# Patient Record
Sex: FEMALE | Race: WHITE | NOT HISPANIC OR LATINO
[De-identification: names, ages, dates, MRNs, and addresses within clinical notes are randomized per-mention and may not be internally consistent; named-entity substitution may affect disease eponyms.]

---

## 2017-04-13 PROBLEM — L71.9 ROSACEA CONJUNCTIVITIS(372.31): Status: ACTIVE | Noted: 2017-04-13

## 2017-06-02 ENCOUNTER — PRIOR ORIGINAL RECORDS (OUTPATIENT)
Dept: OTHER | Age: 77
End: 2017-06-02

## 2017-10-20 ENCOUNTER — CHARTING TRANS (OUTPATIENT)
Dept: OTHER | Age: 77
End: 2017-10-20

## 2017-11-03 ENCOUNTER — PRIOR ORIGINAL RECORDS (OUTPATIENT)
Dept: OTHER | Age: 77
End: 2017-11-03

## 2017-11-27 PROBLEM — E55.9 VITAMIN D DEFICIENCY: Status: ACTIVE | Noted: 2017-11-27

## 2017-11-27 PROBLEM — H10.829 ROSACEA CONJUNCTIVITIS: Status: ACTIVE | Noted: 2017-04-13

## 2017-11-27 PROBLEM — L71.8 ROSACEA CONJUNCTIVITIS: Status: ACTIVE | Noted: 2017-04-13

## 2017-11-27 PROBLEM — J44.9 CHRONIC OBSTRUCTIVE PULMONARY DISEASE, UNSPECIFIED COPD TYPE (HCC): Status: ACTIVE | Noted: 2017-11-27

## 2017-12-04 LAB
BUN: 16 MG/DL
CHOLESTEROL, TOTAL: 192 MG/DL
HDL CHOLESTEROL: 111 MG/DL
LDL CHOLESTEROL: 70 MG/DL
TRIGLYCERIDES: 55 MG/DL
VITAMIN D 25-OH: 97.27 NG/ML

## 2017-12-05 ENCOUNTER — PRIOR ORIGINAL RECORDS (OUTPATIENT)
Dept: OTHER | Age: 77
End: 2017-12-05

## 2018-03-17 ENCOUNTER — PRIOR ORIGINAL RECORDS (OUTPATIENT)
Dept: OTHER | Age: 78
End: 2018-03-17

## 2018-03-17 ENCOUNTER — LAB ENCOUNTER (OUTPATIENT)
Dept: LAB | Age: 78
End: 2018-03-17
Payer: MEDICARE

## 2018-03-17 DIAGNOSIS — E78.00 PURE HYPERCHOLESTEROLEMIA: Primary | ICD-10-CM

## 2018-03-17 LAB
ALT SERPL-CCNC: 25 U/L (ref 14–54)
AST SERPL-CCNC: 22 U/L (ref 15–41)

## 2018-03-17 PROCEDURE — 84450 TRANSFERASE (AST) (SGOT): CPT

## 2018-03-17 PROCEDURE — 84460 ALANINE AMINO (ALT) (SGPT): CPT

## 2018-03-19 LAB
ALBUMIN: 3.5 G/DL
ALKALINE PHOSPHATATE(ALK PHOS): 70 IU/L
ALT (SGPT): 31 U/L
AST (SGOT): 22 U/L
BILIRUBIN TOTAL: 0.57 MG/DL
BUN: 16 MG/DL
CALCIUM: 9.6 MG/DL
CHLORIDE: 108 MEQ/L
CHOLESTEROL, TOTAL: 173 MG/DL
CREATININE, SERUM: 0.87 MG/DL
GLUCOSE: 99 MG/DL
GLUCOSE: 99 MG/DL
LDL CHOLESTEROL: 68 MG/DL
POTASSIUM, SERUM: 4.1 MEQ/L
PROTEIN, TOTAL: 6.7 G/DL
SGOT (AST): 22 IU/L
SGPT (ALT): 31 IU/L
SODIUM: 144 MEQ/L
TRIGLYCERIDES: 53 MG/DL

## 2018-03-20 PROBLEM — F32.1 MODERATE SINGLE CURRENT EPISODE OF MAJOR DEPRESSIVE DISORDER (HCC): Status: ACTIVE | Noted: 2018-03-20

## 2018-03-20 PROBLEM — L71.8 ROSACEA CONJUNCTIVITIS: Status: RESOLVED | Noted: 2017-04-13 | Resolved: 2018-03-20

## 2018-03-20 PROBLEM — H10.829 ROSACEA CONJUNCTIVITIS: Status: RESOLVED | Noted: 2017-04-13 | Resolved: 2018-03-20

## 2018-12-04 ENCOUNTER — MYAURORA ACCOUNT LINK (OUTPATIENT)
Dept: OTHER | Age: 78
End: 2018-12-04

## 2018-12-06 ENCOUNTER — PRIOR ORIGINAL RECORDS (OUTPATIENT)
Dept: OTHER | Age: 78
End: 2018-12-06

## 2018-12-17 ENCOUNTER — PRIOR ORIGINAL RECORDS (OUTPATIENT)
Dept: OTHER | Age: 78
End: 2018-12-17

## 2018-12-17 ENCOUNTER — MYAURORA ACCOUNT LINK (OUTPATIENT)
Dept: OTHER | Age: 78
End: 2018-12-17

## 2019-02-28 VITALS
WEIGHT: 141 LBS | HEIGHT: 59 IN | BODY MASS INDEX: 28.43 KG/M2 | SYSTOLIC BLOOD PRESSURE: 100 MMHG | HEART RATE: 90 BPM | DIASTOLIC BLOOD PRESSURE: 60 MMHG

## 2019-02-28 VITALS
DIASTOLIC BLOOD PRESSURE: 60 MMHG | OXYGEN SATURATION: 98 % | BODY MASS INDEX: 29.64 KG/M2 | SYSTOLIC BLOOD PRESSURE: 110 MMHG | HEIGHT: 59 IN | HEART RATE: 67 BPM | WEIGHT: 147 LBS

## 2019-03-12 ENCOUNTER — LAB ENCOUNTER (OUTPATIENT)
Dept: LAB | Age: 79
End: 2019-03-12
Attending: INTERNAL MEDICINE
Payer: MEDICARE

## 2019-03-12 DIAGNOSIS — E78.00 PURE HYPERCHOLESTEROLEMIA: Primary | ICD-10-CM

## 2019-03-12 LAB
ALBUMIN SERPL-MCNC: 3.8 G/DL
ALBUMIN SERPL-MCNC: 3.8 G/DL (ref 3.4–5)
ALBUMIN/GLOB SERPL: 1.2 {RATIO} (ref 1–2)
ALBUMIN/GLOB SERPL: NORMAL {RATIO}
ALP LIVER SERPL-CCNC: 71 U/L (ref 55–142)
ALP SERPL-CCNC: 71 U/L
ALT SERPL-CCNC: 29 U/L
ALT SERPL-CCNC: 29 U/L (ref 13–56)
ANION GAP SERPL CALC-SCNC: 7 MMOL/L (ref 0–18)
ANION GAP SERPL CALC-SCNC: NORMAL MMOL/L
AST SERPL-CCNC: 26 U/L
AST SERPL-CCNC: 26 U/L (ref 15–37)
BILIRUB SERPL-MCNC: 0.6 MG/DL
BILIRUB SERPL-MCNC: 0.6 MG/DL (ref 0.1–2)
BUN BLD-MCNC: 14 MG/DL (ref 7–18)
BUN SERPL-MCNC: 14 MG/DL
BUN/CREAT SERPL: 14.7 (ref 10–20)
BUN/CREAT SERPL: NORMAL
CALCIUM BLD-MCNC: 9.1 MG/DL (ref 8.5–10.1)
CALCIUM SERPL-MCNC: 9.1 MG/DL
CHLORIDE SERPL-SCNC: 111 MMOL/L
CHLORIDE SERPL-SCNC: 111 MMOL/L (ref 98–107)
CHOLEST SERPL-MCNC: 191 MG/DL
CHOLEST/HDLC SERPL: NORMAL {RATIO}
CK SERPL-CCNC: 107 U/L
CO2 SERPL-SCNC: 27 MMOL/L (ref 21–32)
CO2 SERPL-SCNC: NORMAL MMOL/L
CREAT BLD-MCNC: 0.95 MG/DL (ref 0.55–1.02)
CREAT SERPL-MCNC: 0.95 MG/DL
GLOBULIN PLAS-MCNC: 3.1 G/DL (ref 2.8–4.4)
GLOBULIN SER-MCNC: 3.1 G/DL
GLUCOSE BLD-MCNC: 94 MG/DL (ref 70–99)
GLUCOSE SERPL-MCNC: 94 MG/DL
HDLC SERPL-MCNC: 105 MG/DL
LDLC SERPL CALC-MCNC: 74 MG/DL
LENGTH OF FAST TIME PATIENT: NORMAL H
LENGTH OF FAST TIME PATIENT: NORMAL H
M PROTEIN MFR SERPL ELPH: 6.9 G/DL (ref 6.4–8.2)
NONHDLC SERPL-MCNC: NORMAL MG/DL
OSMOLALITY SERPL CALC.SUM OF ELEC: 300 MOSM/KG (ref 275–295)
POTASSIUM SERPL-SCNC: 4.1 MMOL/L
POTASSIUM SERPL-SCNC: 4.1 MMOL/L (ref 3.5–5.1)
PROT SERPL-MCNC: 6.9 G/DL
SODIUM SERPL-SCNC: 145 MMOL/L
SODIUM SERPL-SCNC: 145 MMOL/L (ref 136–145)
TRIGL SERPL-MCNC: 59 MG/DL
VLDLC SERPL CALC-MCNC: NORMAL MG/DL

## 2019-03-12 PROCEDURE — 80053 COMPREHEN METABOLIC PANEL: CPT

## 2019-03-13 ENCOUNTER — CLINICAL ABSTRACT (OUTPATIENT)
Dept: CARDIOLOGY | Age: 79
End: 2019-03-13

## 2019-03-14 ENCOUNTER — CLINICAL ABSTRACT (OUTPATIENT)
Dept: CARDIOLOGY | Age: 79
End: 2019-03-14

## 2019-03-22 ENCOUNTER — TELEPHONE (OUTPATIENT)
Dept: CARDIOLOGY | Age: 79
End: 2019-03-22

## 2019-04-17 ENCOUNTER — TELEPHONE (OUTPATIENT)
Dept: CARDIOLOGY | Age: 79
End: 2019-04-17

## 2019-04-18 PROBLEM — E78.00 HYPERCHOLESTEROLEMIA: Status: ACTIVE | Noted: 2019-04-18

## 2019-04-18 PROBLEM — I25.10 CAD (CORONARY ARTERY DISEASE): Status: ACTIVE | Noted: 2019-04-18

## 2019-04-18 PROBLEM — Z98.61 HISTORY OF PTCA: Status: ACTIVE | Noted: 2019-04-18

## 2019-04-18 RX ORDER — ALPRAZOLAM 0.25 MG/1
0.25 TABLET ORAL NIGHTLY PRN
COMMUNITY
Start: 2016-12-23

## 2019-04-18 RX ORDER — PRAVASTATIN SODIUM 20 MG
20 TABLET ORAL
COMMUNITY
Start: 2017-12-05 | End: 2019-05-21 | Stop reason: SDUPTHER

## 2019-04-18 RX ORDER — FLUTICASONE PROPIONATE 50 MCG
SPRAY, SUSPENSION (ML) NASAL
COMMUNITY
Start: 2017-12-05

## 2019-04-18 RX ORDER — TRAZODONE HYDROCHLORIDE 50 MG/1
50 TABLET ORAL
COMMUNITY
Start: 2016-12-23

## 2019-04-19 ENCOUNTER — APPOINTMENT (OUTPATIENT)
Dept: CARDIOLOGY | Age: 79
End: 2019-04-19

## 2019-04-19 ENCOUNTER — OFFICE VISIT (OUTPATIENT)
Dept: CARDIOLOGY | Age: 79
End: 2019-04-19

## 2019-04-19 DIAGNOSIS — Z98.61 HISTORY OF PTCA: Primary | ICD-10-CM

## 2019-04-19 DIAGNOSIS — Z01.810 PREOP CARDIOVASCULAR EXAM: ICD-10-CM

## 2019-04-19 DIAGNOSIS — I25.10 CORONARY ARTERY DISEASE INVOLVING NATIVE CORONARY ARTERY OF NATIVE HEART WITHOUT ANGINA PECTORIS: ICD-10-CM

## 2019-04-19 PROCEDURE — 99214 OFFICE O/P EST MOD 30 MIN: CPT | Performed by: INTERNAL MEDICINE

## 2019-04-19 PROCEDURE — 93000 ELECTROCARDIOGRAM COMPLETE: CPT | Performed by: INTERNAL MEDICINE

## 2019-04-19 ASSESSMENT — PAIN SCALES - GENERAL: PAINLEVEL: 0

## 2019-04-20 RX ORDER — CHOLECALCIFEROL (VITAMIN D3) 125 MCG
5 CAPSULE ORAL DAILY
COMMUNITY

## 2019-04-22 ENCOUNTER — DOCUMENTATION (OUTPATIENT)
Dept: CARDIOLOGY | Age: 79
End: 2019-04-22

## 2019-04-23 NOTE — H&P
[facility]    PATIENT'S NAME: Rafaela Rodrigues   ATTENDING PHYSICIAN: Delmar Mandel MD   PATIENT ACCOUNT#:   [id number]    LOCATION:  [facility]  MEDICAL RECORD #:   [id number]       YOB: 1940  ADMISSION DATE:       04/25/201 lower extremity shows slight fullness over the prepatellar bursa right knee without erythema. A small sinus is noted with a small amount of bursal fluid that can be expressed with palpation of the prepatellar bursa.   She comes out to full extension, flexe

## 2019-04-25 ENCOUNTER — ANESTHESIA EVENT (OUTPATIENT)
Dept: SURGERY | Facility: HOSPITAL | Age: 79
End: 2019-04-25

## 2019-04-25 ENCOUNTER — HOSPITAL ENCOUNTER (OUTPATIENT)
Facility: HOSPITAL | Age: 79
Setting detail: HOSPITAL OUTPATIENT SURGERY
Discharge: HOME | End: 2019-04-25
Attending: ORTHOPAEDIC SURGERY | Admitting: ORTHOPAEDIC SURGERY
Payer: MEDICARE

## 2019-04-25 ENCOUNTER — ANESTHESIA (OUTPATIENT)
Dept: SURGERY | Facility: HOSPITAL | Age: 79
End: 2019-04-25

## 2019-04-25 VITALS
WEIGHT: 146.13 LBS | HEIGHT: 58 IN | TEMPERATURE: 98 F | BODY MASS INDEX: 30.67 KG/M2 | DIASTOLIC BLOOD PRESSURE: 68 MMHG | HEART RATE: 51 BPM | SYSTOLIC BLOOD PRESSURE: 142 MMHG | OXYGEN SATURATION: 99 % | RESPIRATION RATE: 14 BRPM

## 2019-04-25 DIAGNOSIS — M70.51 PATELLAR BURSITIS OF RIGHT KNEE: ICD-10-CM

## 2019-04-25 PROCEDURE — 0JBN0ZZ EXCISION OF RIGHT LOWER LEG SUBCUTANEOUS TISSUE AND FASCIA, OPEN APPROACH: ICD-10-PCS | Performed by: ORTHOPAEDIC SURGERY

## 2019-04-25 PROCEDURE — 0MTN0ZZ RESECTION OF RIGHT KNEE BURSA AND LIGAMENT, OPEN APPROACH: ICD-10-PCS | Performed by: ORTHOPAEDIC SURGERY

## 2019-04-25 PROCEDURE — 88304 TISSUE EXAM BY PATHOLOGIST: CPT | Performed by: ORTHOPAEDIC SURGERY

## 2019-04-25 PROCEDURE — 88305 TISSUE EXAM BY PATHOLOGIST: CPT | Performed by: ORTHOPAEDIC SURGERY

## 2019-04-25 RX ORDER — ONDANSETRON 2 MG/ML
INJECTION INTRAMUSCULAR; INTRAVENOUS AS NEEDED
Status: DISCONTINUED | OUTPATIENT
Start: 2019-04-25 | End: 2019-04-25 | Stop reason: SURG

## 2019-04-25 RX ORDER — SODIUM CHLORIDE, SODIUM LACTATE, POTASSIUM CHLORIDE, CALCIUM CHLORIDE 600; 310; 30; 20 MG/100ML; MG/100ML; MG/100ML; MG/100ML
INJECTION, SOLUTION INTRAVENOUS CONTINUOUS
Status: DISCONTINUED | OUTPATIENT
Start: 2019-04-25 | End: 2019-04-25

## 2019-04-25 RX ORDER — HYDROCODONE BITARTRATE AND ACETAMINOPHEN 5; 325 MG/1; MG/1
1 TABLET ORAL AS NEEDED
Status: DISCONTINUED | OUTPATIENT
Start: 2019-04-25 | End: 2019-04-25

## 2019-04-25 RX ORDER — METOCLOPRAMIDE 10 MG/1
10 TABLET ORAL ONCE
Status: DISCONTINUED | OUTPATIENT
Start: 2019-04-25 | End: 2019-04-25 | Stop reason: HOSPADM

## 2019-04-25 RX ORDER — DEXAMETHASONE SODIUM PHOSPHATE 4 MG/ML
VIAL (ML) INJECTION AS NEEDED
Status: DISCONTINUED | OUTPATIENT
Start: 2019-04-25 | End: 2019-04-25 | Stop reason: SURG

## 2019-04-25 RX ORDER — HYDROCODONE BITARTRATE AND ACETAMINOPHEN 5; 325 MG/1; MG/1
2 TABLET ORAL AS NEEDED
Status: DISCONTINUED | OUTPATIENT
Start: 2019-04-25 | End: 2019-04-25

## 2019-04-25 RX ORDER — ACETAMINOPHEN 500 MG
1000 TABLET ORAL ONCE
Status: COMPLETED | OUTPATIENT
Start: 2019-04-25 | End: 2019-04-25

## 2019-04-25 RX ORDER — LIDOCAINE HYDROCHLORIDE 10 MG/ML
INJECTION, SOLUTION EPIDURAL; INFILTRATION; INTRACAUDAL; PERINEURAL AS NEEDED
Status: DISCONTINUED | OUTPATIENT
Start: 2019-04-25 | End: 2019-04-25 | Stop reason: SURG

## 2019-04-25 RX ORDER — ONDANSETRON 2 MG/ML
4 INJECTION INTRAMUSCULAR; INTRAVENOUS ONCE AS NEEDED
Status: DISCONTINUED | OUTPATIENT
Start: 2019-04-25 | End: 2019-04-25

## 2019-04-25 RX ORDER — MORPHINE SULFATE 10 MG/ML
6 INJECTION, SOLUTION INTRAMUSCULAR; INTRAVENOUS EVERY 10 MIN PRN
Status: DISCONTINUED | OUTPATIENT
Start: 2019-04-25 | End: 2019-04-25

## 2019-04-25 RX ORDER — HALOPERIDOL 5 MG/ML
0.25 INJECTION INTRAMUSCULAR ONCE AS NEEDED
Status: DISCONTINUED | OUTPATIENT
Start: 2019-04-25 | End: 2019-04-25

## 2019-04-25 RX ORDER — CEFAZOLIN SODIUM/WATER 2 G/20 ML
2 SYRINGE (ML) INTRAVENOUS ONCE
Status: COMPLETED | OUTPATIENT
Start: 2019-04-25 | End: 2019-04-25

## 2019-04-25 RX ORDER — TRAMADOL HYDROCHLORIDE 50 MG/1
TABLET ORAL
Qty: 20 TABLET | Refills: 0 | Status: SHIPPED | OUTPATIENT
Start: 2019-04-25 | End: 2019-11-08

## 2019-04-25 RX ORDER — FAMOTIDINE 20 MG/1
20 TABLET ORAL ONCE
Status: DISCONTINUED | OUTPATIENT
Start: 2019-04-25 | End: 2019-04-25 | Stop reason: HOSPADM

## 2019-04-25 RX ORDER — BUPIVACAINE HYDROCHLORIDE AND EPINEPHRINE 5; 5 MG/ML; UG/ML
INJECTION, SOLUTION PERINEURAL AS NEEDED
Status: DISCONTINUED | OUTPATIENT
Start: 2019-04-25 | End: 2019-04-25 | Stop reason: HOSPADM

## 2019-04-25 RX ORDER — MORPHINE SULFATE 4 MG/ML
4 INJECTION, SOLUTION INTRAMUSCULAR; INTRAVENOUS EVERY 10 MIN PRN
Status: DISCONTINUED | OUTPATIENT
Start: 2019-04-25 | End: 2019-04-25

## 2019-04-25 RX ORDER — MORPHINE SULFATE 4 MG/ML
2 INJECTION, SOLUTION INTRAMUSCULAR; INTRAVENOUS EVERY 10 MIN PRN
Status: DISCONTINUED | OUTPATIENT
Start: 2019-04-25 | End: 2019-04-25

## 2019-04-25 RX ORDER — NALOXONE HYDROCHLORIDE 0.4 MG/ML
80 INJECTION, SOLUTION INTRAMUSCULAR; INTRAVENOUS; SUBCUTANEOUS AS NEEDED
Status: DISCONTINUED | OUTPATIENT
Start: 2019-04-25 | End: 2019-04-25

## 2019-04-25 RX ADMIN — CEFAZOLIN SODIUM/WATER 2 G: 2 G/20 ML SYRINGE (ML) INTRAVENOUS at 14:42:00

## 2019-04-25 RX ADMIN — DEXAMETHASONE SODIUM PHOSPHATE 4 MG: 4 MG/ML VIAL (ML) INJECTION at 14:57:00

## 2019-04-25 RX ADMIN — LIDOCAINE HYDROCHLORIDE 50 MG: 10 INJECTION, SOLUTION EPIDURAL; INFILTRATION; INTRACAUDAL; PERINEURAL at 14:37:00

## 2019-04-25 RX ADMIN — ONDANSETRON 4 MG: 2 INJECTION INTRAMUSCULAR; INTRAVENOUS at 14:57:00

## 2019-04-25 RX ADMIN — SODIUM CHLORIDE, SODIUM LACTATE, POTASSIUM CHLORIDE, CALCIUM CHLORIDE: 600; 310; 30; 20 INJECTION, SOLUTION INTRAVENOUS at 14:34:00

## 2019-04-25 NOTE — ANESTHESIA PROCEDURE NOTES
Airway  Urgency: elective      General Information and Staff    Patient location during procedure: OR  Anesthesiologist: Dasha Pineda MD  Resident/CRNA: Gabo Loving CRNA  Performed: CRNA     Indications and Patient Condition  Indications for airway

## 2019-04-25 NOTE — ANESTHESIA PREPROCEDURE EVALUATION
Anesthesia PreOp Note    HPI:     Ron Garner is a 67year old female who presents for preoperative consultation requested by: Ricco Herman MD    Date of Surgery: 4/25/2019    Procedure(s):  EXCISION LESION LOWER EXTREMITY  Indication: Patellar • Hepatitis     possible history now resolved   • Obstructive apnea     AHI 25 on CPAP 6 [employer]   • OTHER DISEASES     bilateral CTS   • OTHER DISEASES     venous varicosities   • Sleep apnea        Past Surgical History:   Procedure Laterality Date   • History   Problem Relation Age of Onset   • Cancer Sister 67        breast cancer   • Breast Cancer Sister         age 70   • Breast Cancer Maternal Grandmother         age 80   • Heart Disorder Father    • Cancer Mother         colon CA   • Breast Cancer Component Value Date     03/12/2019     03/12/2019    K 4.1 03/12/2019    K 4.09 03/12/2019     (H) 03/12/2019     (H) 03/12/2019    CO2 27.0 03/12/2019    CO2 24.1 03/12/2019    BUN 14 03/12/2019    BUN 14.0 03/12/2019    VICTOR M

## 2019-04-25 NOTE — INTERVAL H&P NOTE
Pre-op Diagnosis: Patellar bursitis of right knee [M70.51]    The above referenced H&P was reviewed by Yee Ma MD on 4/25/2019, the patient was examined and no significant changes have occurred in the patient's condition since the H&P was perfor

## 2019-04-25 NOTE — ANESTHESIA POSTPROCEDURE EVALUATION
Patient: Stalin Serrato    Procedure Summary     Date:  04/25/19 Room / Location:  [facility] OR [address] [place] [address] OR    Anesthesia Start:  1665 Anesthesia Stop:  2582    Procedure:  EXCISION LESION LOWER EXTREMITY (Right ) Diagnosis:       Patellar bursitis of

## 2019-04-25 NOTE — BRIEF OP NOTE
Pre-Operative Diagnosis: Patellar bursitis of right knee [M70.51]     Post-Operative Diagnosis: Patellar bursitis of right knee [M70.51]      Procedure Performed:   Procedure(s):  RIGHT KNEE EXCISION OF PREPATELLAR BURSA    Surgeon(s) and Role:     * Leandra

## 2019-04-26 NOTE — OPERATIVE REPORT
[facility]    PATIENT'S NAME: Juan Ramon Trevino   ATTENDING PHYSICIAN: Delmar Zimmer MD   OPERATING PHYSICIAN: Delmar Zimmer MD   PATIENT ACCOUNT#:   [id number]    LOCATION:  [facility] 4 [facility] 10  MEDICAL RECORD #:   [id number]       D followed by a pneumatic pressure tourniquet. The right lower extremity was prepped and draped in the usual sterile fashion. Time-out was performed. The limb was then prepped and draped in usual sterile fashion.   The limb was then exsanguinated using Esm

## 2019-05-21 RX ORDER — PRAVASTATIN SODIUM 20 MG
TABLET ORAL
Qty: 90 TABLET | Refills: 2 | Status: SHIPPED | OUTPATIENT
Start: 2019-05-21 | End: 2020-01-29 | Stop reason: SDUPTHER

## 2019-10-30 PROCEDURE — 88304 TISSUE EXAM BY PATHOLOGIST: CPT | Performed by: SURGERY

## 2019-12-17 ENCOUNTER — OFFICE VISIT (OUTPATIENT)
Dept: CARDIOLOGY | Age: 79
End: 2019-12-17

## 2019-12-17 VITALS
WEIGHT: 147 LBS | OXYGEN SATURATION: 95 % | HEIGHT: 59 IN | SYSTOLIC BLOOD PRESSURE: 136 MMHG | HEART RATE: 71 BPM | BODY MASS INDEX: 29.64 KG/M2 | DIASTOLIC BLOOD PRESSURE: 80 MMHG

## 2019-12-17 DIAGNOSIS — I25.10 CORONARY ARTERY DISEASE INVOLVING NATIVE CORONARY ARTERY OF NATIVE HEART WITHOUT ANGINA PECTORIS: Primary | ICD-10-CM

## 2019-12-17 DIAGNOSIS — E78.00 HYPERCHOLESTEROLEMIA: ICD-10-CM

## 2019-12-17 PROCEDURE — 99214 OFFICE O/P EST MOD 30 MIN: CPT | Performed by: INTERNAL MEDICINE

## 2019-12-17 ASSESSMENT — PATIENT HEALTH QUESTIONNAIRE - PHQ9
1. LITTLE INTEREST OR PLEASURE IN DOING THINGS: NOT AT ALL
2. FEELING DOWN, DEPRESSED OR HOPELESS: NOT AT ALL
SUM OF ALL RESPONSES TO PHQ9 QUESTIONS 1 AND 2: 0
SUM OF ALL RESPONSES TO PHQ9 QUESTIONS 1 AND 2: 0

## 2020-01-29 RX ORDER — PRAVASTATIN SODIUM 20 MG
TABLET ORAL
Qty: 90 TABLET | Refills: 1 | Status: SHIPPED | OUTPATIENT
Start: 2020-01-29 | End: 2020-07-23

## 2020-03-09 LAB
ALT SERPL-CCNC: 13 UNITS/L
AST SERPL-CCNC: 22 UNITS/L
CHOLEST SERPL-MCNC: 170 MG/DL
HDLC SERPL-MCNC: 97 MG/DL
LDLC SERPL CALC-MCNC: 65 MG/DL
LENGTH OF FAST TIME PATIENT: YES H
TRIGL SERPL-MCNC: 39 MG/DL
VLDLC SERPL CALC-MCNC: 8 MG/DL

## 2020-07-23 RX ORDER — PRAVASTATIN SODIUM 20 MG
TABLET ORAL
Qty: 90 TABLET | Refills: 1 | Status: SHIPPED | OUTPATIENT
Start: 2020-07-23 | End: 2021-01-15

## 2020-12-01 PROBLEM — Z80.0 FAMILY HISTORY OF COLON CANCER: Status: ACTIVE | Noted: 2020-12-01

## 2020-12-22 ENCOUNTER — TELEPHONE (OUTPATIENT)
Dept: CARDIOLOGY | Age: 80
End: 2020-12-22

## 2020-12-27 ENCOUNTER — E-ADVICE (OUTPATIENT)
Dept: CARDIOLOGY | Age: 80
End: 2020-12-27

## 2021-01-15 RX ORDER — PRAVASTATIN SODIUM 20 MG
TABLET ORAL
Qty: 90 TABLET | Refills: 0 | Status: SHIPPED | OUTPATIENT
Start: 2021-01-15 | End: 2021-04-12

## 2021-01-29 ENCOUNTER — OFFICE VISIT (OUTPATIENT)
Dept: CARDIOLOGY | Age: 81
End: 2021-01-29

## 2021-01-29 VITALS
BODY MASS INDEX: 29.81 KG/M2 | SYSTOLIC BLOOD PRESSURE: 124 MMHG | HEIGHT: 58 IN | DIASTOLIC BLOOD PRESSURE: 68 MMHG | HEART RATE: 75 BPM | WEIGHT: 142 LBS

## 2021-01-29 DIAGNOSIS — E78.00 HYPERCHOLESTEROLEMIA: ICD-10-CM

## 2021-01-29 DIAGNOSIS — I25.10 CORONARY ARTERY DISEASE INVOLVING NATIVE CORONARY ARTERY OF NATIVE HEART WITHOUT ANGINA PECTORIS: Primary | ICD-10-CM

## 2021-01-29 PROCEDURE — 99214 OFFICE O/P EST MOD 30 MIN: CPT | Performed by: INTERNAL MEDICINE

## 2021-01-29 ASSESSMENT — PATIENT HEALTH QUESTIONNAIRE - PHQ9
CLINICAL INTERPRETATION OF PHQ2 SCORE: NO FURTHER SCREENING NEEDED
CLINICAL INTERPRETATION OF PHQ9 SCORE: NO FURTHER SCREENING NEEDED
SUM OF ALL RESPONSES TO PHQ9 QUESTIONS 1 AND 2: 0
1. LITTLE INTEREST OR PLEASURE IN DOING THINGS: NOT AT ALL
SUM OF ALL RESPONSES TO PHQ9 QUESTIONS 1 AND 2: 0
2. FEELING DOWN, DEPRESSED OR HOPELESS: NOT AT ALL

## 2021-03-05 ENCOUNTER — E-ADVICE (OUTPATIENT)
Dept: CARDIOLOGY | Age: 81
End: 2021-03-05

## 2021-03-05 DIAGNOSIS — Z01.812 PRE-OPERATIVE LABORATORY EXAMINATION: Primary | ICD-10-CM

## 2021-03-08 LAB
CHOLEST SERPL-MCNC: 181 MG/DL
HDLC SERPL-MCNC: 102 MG/DL
LDLC SERPL CALC-MCNC: 67 MG/DL
LENGTH OF FAST TIME PATIENT: YES H
TRIGL SERPL-MCNC: 62 MG/DL
VLDLC SERPL CALC-MCNC: 12 MG/DL

## 2021-03-11 ENCOUNTER — TELEPHONE (OUTPATIENT)
Dept: CARDIOLOGY | Age: 81
End: 2021-03-11

## 2021-04-12 RX ORDER — PRAVASTATIN SODIUM 20 MG
TABLET ORAL
Qty: 90 TABLET | Refills: 2 | Status: SHIPPED | OUTPATIENT
Start: 2021-04-12

## 2021-05-21 ENCOUNTER — APPOINTMENT (OUTPATIENT)
Dept: CARDIOLOGY | Age: 81
End: 2021-05-21

## 2021-05-25 VITALS
SYSTOLIC BLOOD PRESSURE: 118 MMHG | HEIGHT: 58 IN | DIASTOLIC BLOOD PRESSURE: 70 MMHG | HEART RATE: 81 BPM | OXYGEN SATURATION: 98 % | WEIGHT: 144 LBS | BODY MASS INDEX: 30.23 KG/M2

## 2022-01-17 ENCOUNTER — APPOINTMENT (OUTPATIENT)
Dept: CARDIOLOGY | Age: 82
End: 2022-01-17
Attending: INTERNAL MEDICINE

## 2022-01-28 ENCOUNTER — APPOINTMENT (OUTPATIENT)
Dept: CARDIOLOGY | Age: 82
End: 2022-01-28

## 2024-11-11 ENCOUNTER — OFFICE VISIT (OUTPATIENT)
Dept: SURGERY | Facility: CLINIC | Age: 84
End: 2024-11-11
Payer: COMMERCIAL

## 2024-11-11 DIAGNOSIS — M65.341 TRIGGER RING FINGER OF RIGHT HAND: Primary | ICD-10-CM

## 2024-11-11 PROCEDURE — 99204 OFFICE O/P NEW MOD 45 MIN: CPT | Performed by: PLASTIC SURGERY

## 2024-11-11 RX ORDER — HYDROCODONE BITARTRATE AND ACETAMINOPHEN 7.5; 325 MG/1; MG/1
1 TABLET ORAL
Qty: 10 TABLET | Refills: 0 | Status: SHIPPED | OUTPATIENT
Start: 2024-11-11

## 2024-11-11 RX ORDER — OMEGA-3/DHA/EPA/FISH OIL 60 MG-90MG
1500 CAPSULE ORAL DAILY
COMMUNITY

## 2024-11-11 RX ORDER — CETIRIZINE HYDROCHLORIDE 10 MG/1
10 TABLET ORAL DAILY
COMMUNITY
Start: 2023-04-03

## 2024-11-11 RX ORDER — IPRATROPIUM BROMIDE 21 UG/1
2 SPRAY, METERED NASAL 2 TIMES DAILY
COMMUNITY
Start: 2024-09-20

## 2024-11-11 NOTE — PROGRESS NOTES
Patient request for surgery signed by patient and witnessed and signed by RN.  Prescription for Norco 7.5 mg electronically sent to pharmacy per Dr. Fields's order and patient instructed to  prescription before surgery.   Pre-Surgical Instruction Handout, Hand Elevation Handout, and Post-Operative Instruction Handout given to and reviewed w/patient.  All questions and concerns answered; pt verbalized an understanding of all pre-operative teaching.  Patient instructed to call the office with any further questions and/or concerns.  Patient escorted to surgery scheduling to schedule surgery and post-operative appointments.

## 2024-11-11 NOTE — H&P (VIEW-ONLY)
Lissette Mack is a 84 year old female that presents with   Chief Complaint   Patient presents with    Pain     Trigger RRF   .    REFERRED BY:  No ref. provider found    Pacemaker: No  Latex Allergy: no  Coumadin: No  Plavix: No  Other anticoagulants: No  Diet medication: No  Cardiac stents: YES     HAND DOMINANCE:  Right    Profession: Retired    RECONSTRUCTIVE HISTORY    SUN EXPOSURE   Current no   Past no   Sunburns no   Tanning salons current no   Tanning salons past no     SKIN CANCER    Personal history of skin cancer: none      HPI:       84-year-old female right-hand-dominant with RRF trigger    4 years duration    Pain with triggering and locking    She has had an injection    It helped for a short period of time    She had bilateral endoscopic carpal tunnel 20 years ago with complete relief of symptoms          Review of Systems:   Constitutional: No change in appetite, chill/rigors, or fatigue  GI: No jaundice  Endocrine: No generalized weakness  Neurological: No aphasia, loss of consciousness, or seizures    Musculoskeletal:    TRIGGER    Right    ring finger    Duration:   3-4 years    PAIN  Yes  TRIGGER Yes  LOCKING Yes    PREVIOUS TREATMENT Injection    Treatment helped?  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    DEPRESSION    Heart attack (HCC)    Hepatitis    possible history now resolved    Obstructive apnea    AHI 25 on CPAP 6 [employer]    OTHER DISEASES    bilateral CTS    OTHER DISEASES    venous varicosities    Sleep apnea        SURGICAL  Past Surgical History:   Procedure Laterality Date    Angioplasty (coronary)      stent    Cataract Bilateral April and May 2018    Colonoscopy  11/2015    Colonoscopy  12/2020    Colonoscopy N/A 12/11/2020    Procedure: COLONOSCOPY, POSSIBLE BIOPSY, POSSIBLE POLYPECTOMY 67740;  Surgeon: Carlos Bennett MD;  Location: [facility] SURGICAL [place], [facility]    Glaucoma surgery  Bilateral April and May 2018    non DMG ophtho    Other accessory Bilateral 2005    RECTR/LECTR    Other surgical history Right 10/31/2019    Exc seb cyst rt lower back/[facility]-Dr Ayesha WOODWARD  Allergies[1]     MEDICATIONS  Current Outpatient Medications   Medication Sig Dispense Refill    Omega-3 Fatty Acids (FISH OIL) 500 MG Oral Cap Take 3 capsules (1,500 mg total) by mouth daily.      ALPRAZolam 0.25 MG Oral Tab Take 1 tablet (0.25 mg total) by mouth nightly as needed for Sleep. 30 tablet 0    traZODone 50 MG Oral Tab Take 1 tablet (50 mg total) by mouth nightly. 90 tablet 3    Fluticasone Propionate 50 MCG/ACT Nasal Suspension INSTILL 2 SPRAY IN EACH NOSTRIL DAILY 48 g 0    Calcium-Magnesium 500-250 MG Oral Tab 1500mg daily      aspirin 81 MG Oral Tab Take 2 tablets (162 mg total) by mouth daily.      Vitamin D3 1000 units Oral Tab Take 1 tablet (1,000 Units total) by mouth daily.      Pravastatin Sodium (PRAVACHOL) 20 MG Oral Tab Take 1 tablet (20 mg total) by mouth nightly.      cetirizine 10 MG Oral Tab Take 1 tablet (10 mg total) by mouth daily. (Patient not taking: Reported on 2024)      ipratropium 0.03 % Nasal Solution 2 sprays by Nasal route 2 (two) times daily. (Patient not taking: Reported on 2024)          SOCIAL HISTORY  Social History     Socioeconomic History    Marital status: Single   Tobacco Use    Smoking status: Former     Current packs/day: 0.00     Average packs/day: 1 pack/day for 25.0 years (25.0 ttl pk-yrs)     Types: Cigarettes     Start date: 1976     Quit date: 2001     Years since quittin.8    Smokeless tobacco: Never    Tobacco comments:     quit    Substance and Sexual Activity    Alcohol use: Yes     Comment: 1 per day    Drug use: No   Other Topics Concern    Right Handed Yes        FAMILY HISTORY  Family History   Problem Relation Age of Onset    Cancer Sister 72        breast cancer    Breast Cancer Sister 71        age 71    Breast Cancer  Maternal Grandmother 82        age 82    Heart Disorder Father     Cancer Mother         colon CA    Breast Cancer Maternal Aunt 65        age 65          PHYSICAL EXAM:     CONSTITUTIONAL: Overall appearance - Normal  HEENT: Normocephalic  EYES: Conjunctiva - Right: Normal, Left: Normal; EOMI  EARS: Inspection - Right: Normal, Left: Normal  NECK/THYROID: Inspection - Normal, Palpation - Normal, Thyroid gland - Normal, No adenopathy  RESPIRATORY: Inspection - Normal, Effort - Normal  CARDIOVASCULAR: Regular rhythm, No murmurs  ABDOMEN: Inspection - Normal, No abdominal tenderness  NEURO: Memory intact  PSYCH: Oriented to person, place, time, and situation, Appropriate mood and affect      Hand Physical Exam:       RRF MP tenderness with triggering and locking      ASSESSMENT/PLAN:       TRIGGER DIGIT RRF          We discussed what a TRIGGER DIGIT is, including treatment options.  Questions were answered and the patient wishes to proceed with treatment.     This needs surgery.   I explained the procedure at length, including  post-operative course and risks as indicated on the Surgical Request Form.  Therapy may be necessary post-operatively.    RISKS:     Bleeding  Infection  Scar  Pain  Stiffness  Weakness  Loss of function  Anesthesia risks               11/11/2024  Martin Fields MD    [facility] Data Reviewed.               +++++++++++++++++++++++++++++++++++++++++++++++++    MEDICAL DECISION MAKING    PROBLEMS      MODERATE    (number / complexity)          Undiagnosed new problem with uncertain prognosis    DATA         STRAIGHTFORWARD    (amount / complexity)           MANAGEMENT RISK  HIGH    (complications/ morbidity)       Major surgery with risk factors                  MDM LEVEL    MODERATE            [1] No Known Allergies

## 2024-11-11 NOTE — H&P
Lissette Mack is a 84 year old female that presents with   Chief Complaint   Patient presents with    Pain     Trigger RRF   .    REFERRED BY:  No ref. provider found    Pacemaker: No  Latex Allergy: no  Coumadin: No  Plavix: No  Other anticoagulants: No  Diet medication: No  Cardiac stents: YES     HAND DOMINANCE:  Right    Profession: Retired    RECONSTRUCTIVE HISTORY    SUN EXPOSURE   Current no   Past no   Sunburns no   Tanning salons current no   Tanning salons past no     SKIN CANCER    Personal history of skin cancer: none      HPI:       84-year-old female right-hand-dominant with RRF trigger    4 years duration    Pain with triggering and locking    She has had an injection    It helped for a short period of time    She had bilateral endoscopic carpal tunnel 20 years ago with complete relief of symptoms          Review of Systems:   Constitutional: No change in appetite, chill/rigors, or fatigue  GI: No jaundice  Endocrine: No generalized weakness  Neurological: No aphasia, loss of consciousness, or seizures    Musculoskeletal:    TRIGGER    Right    ring finger    Duration:   3-4 years    PAIN  Yes  TRIGGER Yes  LOCKING Yes    PREVIOUS TREATMENT Injection    Treatment helped?  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    DEPRESSION    Heart attack (HCC)    Hepatitis    possible history now resolved    Obstructive apnea    AHI 25 on CPAP 6 [employer]    OTHER DISEASES    bilateral CTS    OTHER DISEASES    venous varicosities    Sleep apnea        SURGICAL  Past Surgical History:   Procedure Laterality Date    Angioplasty (coronary)      stent    Cataract Bilateral April and May 2018    Colonoscopy  11/2015    Colonoscopy  12/2020    Colonoscopy N/A 12/11/2020    Procedure: COLONOSCOPY, POSSIBLE BIOPSY, POSSIBLE POLYPECTOMY 77994;  Surgeon: Carlos Bennett MD;  Location: [facility] SURGICAL [place], [facility]    Glaucoma surgery  Bilateral April and May 2018    non DMG ophtho    Other accessory Bilateral 2005    RECTR/LECTR    Other surgical history Right 10/31/2019    Exc seb cyst rt lower back/[facility]-Dr Ayesha WOODWARD  Allergies[1]     MEDICATIONS  Current Outpatient Medications   Medication Sig Dispense Refill    Omega-3 Fatty Acids (FISH OIL) 500 MG Oral Cap Take 3 capsules (1,500 mg total) by mouth daily.      ALPRAZolam 0.25 MG Oral Tab Take 1 tablet (0.25 mg total) by mouth nightly as needed for Sleep. 30 tablet 0    traZODone 50 MG Oral Tab Take 1 tablet (50 mg total) by mouth nightly. 90 tablet 3    Fluticasone Propionate 50 MCG/ACT Nasal Suspension INSTILL 2 SPRAY IN EACH NOSTRIL DAILY 48 g 0    Calcium-Magnesium 500-250 MG Oral Tab 1500mg daily      aspirin 81 MG Oral Tab Take 2 tablets (162 mg total) by mouth daily.      Vitamin D3 1000 units Oral Tab Take 1 tablet (1,000 Units total) by mouth daily.      Pravastatin Sodium (PRAVACHOL) 20 MG Oral Tab Take 1 tablet (20 mg total) by mouth nightly.      cetirizine 10 MG Oral Tab Take 1 tablet (10 mg total) by mouth daily. (Patient not taking: Reported on 2024)      ipratropium 0.03 % Nasal Solution 2 sprays by Nasal route 2 (two) times daily. (Patient not taking: Reported on 2024)          SOCIAL HISTORY  Social History     Socioeconomic History    Marital status: Single   Tobacco Use    Smoking status: Former     Current packs/day: 0.00     Average packs/day: 1 pack/day for 25.0 years (25.0 ttl pk-yrs)     Types: Cigarettes     Start date: 1976     Quit date: 2001     Years since quittin.8    Smokeless tobacco: Never    Tobacco comments:     quit    Substance and Sexual Activity    Alcohol use: Yes     Comment: 1 per day    Drug use: No   Other Topics Concern    Right Handed Yes        FAMILY HISTORY  Family History   Problem Relation Age of Onset    Cancer Sister 72        breast cancer    Breast Cancer Sister 71        age 71    Breast Cancer  Maternal Grandmother 82        age 82    Heart Disorder Father     Cancer Mother         colon CA    Breast Cancer Maternal Aunt 65        age 65          PHYSICAL EXAM:     CONSTITUTIONAL: Overall appearance - Normal  HEENT: Normocephalic  EYES: Conjunctiva - Right: Normal, Left: Normal; EOMI  EARS: Inspection - Right: Normal, Left: Normal  NECK/THYROID: Inspection - Normal, Palpation - Normal, Thyroid gland - Normal, No adenopathy  RESPIRATORY: Inspection - Normal, Effort - Normal  CARDIOVASCULAR: Regular rhythm, No murmurs  ABDOMEN: Inspection - Normal, No abdominal tenderness  NEURO: Memory intact  PSYCH: Oriented to person, place, time, and situation, Appropriate mood and affect      Hand Physical Exam:       RRF MP tenderness with triggering and locking      ASSESSMENT/PLAN:       TRIGGER DIGIT RRF          We discussed what a TRIGGER DIGIT is, including treatment options.  Questions were answered and the patient wishes to proceed with treatment.     This needs surgery.   I explained the procedure at length, including  post-operative course and risks as indicated on the Surgical Request Form.  Therapy may be necessary post-operatively.    RISKS:     Bleeding  Infection  Scar  Pain  Stiffness  Weakness  Loss of function  Anesthesia risks               11/11/2024  Martin Fields MD    [facility] Data Reviewed.               +++++++++++++++++++++++++++++++++++++++++++++++++    MEDICAL DECISION MAKING    PROBLEMS      MODERATE    (number / complexity)          Undiagnosed new problem with uncertain prognosis    DATA         STRAIGHTFORWARD    (amount / complexity)           MANAGEMENT RISK  HIGH    (complications/ morbidity)       Major surgery with risk factors                  MDM LEVEL    MODERATE            [1] No Known Allergies

## 2024-11-27 NOTE — DISCHARGE INSTRUCTIONS
Dr Fields Discharge Instructions      Martin Fields M.D.   [phone]  Plastic and Reconstructive Surgery, Hand Surgery  [address], [address]  [place] [address]     GENERAL INSTRUCTIONS:  Do not remove dressing for any reason.  Keep dressing clean and dry.  Some drainage (blood and fluid) through the dressing is expected.  Some swelling is normal.  Take medications as directed.      HANDS:  Keep elevated (above heart-level) at all times.  Check fingertips for circulation.  Keep in a sling at all times.         YOU HAVE AN APPOINTMENT AT THE OFFICE ON  12-4-24.                  HOME INSTRUCTIONS  [facility] HOME CARE INSTRUCTIONS: POST-OP ANESTHESIA  The medication that you received for sedation or general anesthesia can last up to 24 hours. Your judgment and reflexes may be altered, even if you feel like your normal self.      We Recommend:   Do not drive any motor vehicle or bicycle   Avoid mowing the lawn, playing sports, or working with power tools/applicances (power saws, electric knives or mixers)   That you have someone stay with you on your first night home   Do not drink alcohol or take sleeping pills or tranquilizers   Do not sign legal documents within 24 hours of your procedure   If you had a nerve block for your surgery, take extra care not to put any pressure on your arm or hand for 24 hours    It is normal:  For you to have a sore throat if you had a breathing tube during surgery (while you were asleep!). The sore throat should get better within 48 hours. You can gargle with warm salt water (1/2 tsp in 4 oz warm water) or use a throat lozenge for comfort  To feel muscle aches or soreness especially in the abdomen, chest or neck. The achy feeling should go away in the next 24 hours  To feel weak, sleepy or \"wiped out\". Your should start feeling better in the next 24 hours.   To experience mild discomforts such as sore lip or tongue, headache, cramps, gas pains or a bloated  feeling in your abdomen.   To experience mild back pain or soreness for a day or two if you had spinal or epidural anesthesia.   If you had laparoscopic surgery, to feel shoulder pain or discomfort on the day of surgery.   For some patients to have nausea after surgery/anesthesia    If you feel nausea or experience vomiting:   Try to move around less.   Eat less than usual or drink only liquids until the next morning   Nausea should resolve in about 24 hours    If you have a problem when you are at home:    Call your surgeons office   Discharge Instructions: After Your Surgery  You’ve just had surgery. During surgery, you were given medicine called anesthesia to keep you relaxed and free of pain. After surgery, you may have some pain or nausea. This is common. Here are some tips for feeling better and getting well after surgery.   Going home  Your healthcare provider will show you how to take care of yourself when you go home. They'll also answer your questions. Have an adult family member or friend drive you home. For the first 24 hours after your surgery:   Don't drive or use heavy equipment.  Don't make important decisions or sign legal papers.  Take medicines as directed.  Don't drink alcohol.  Have someone stay with you, if needed. They can watch for problems and help keep you safe.  Be sure to go to all follow-up visits with your healthcare provider. And rest after your surgery for as long as your provider tells you to.   Coping with pain  If you have pain after surgery, pain medicine will help you feel better. Take it as directed, before pain becomes severe. Also, ask your healthcare provider or pharmacist about other ways to control pain. This might be with heat, ice, or relaxation. And follow any other instructions your surgeon or nurse gives you.      Stay on schedule with your medicine.     Tips for taking pain medicine  To get the best relief possible, remember these points:   Pain medicines can upset  your stomach. Taking them with a little food may help.  Most pain relievers taken by mouth need at least 20 to 30 minutes to start to work.  Don't wait till your pain becomes severe before you take your medicine. Try to time your medicine so that you can take it before starting an activity. This might be before you get dressed, go for a walk, or sit down for dinner.  Constipation is a common side effect of some pain medicines. Call your healthcare provider before taking any medicines such as laxatives or stool softeners to help ease constipation. Also ask if you should skip any foods. Drinking lots of fluids and eating foods such as fruits and vegetables that are high in fiber can also help. Remember, don't take laxatives unless your surgeon has prescribed them.  Drinking alcohol and taking pain medicine can cause dizziness and slow your breathing. It can even be deadly. Don't drink alcohol while taking pain medicine.  Pain medicine can make you react more slowly to things. Don't drive or run machinery while taking pain medicine.  Your healthcare provider may tell you to take acetaminophen to help ease your pain. Ask them how much you're supposed to take each day. Acetaminophen or other pain relievers may interact with your prescription medicines or other over-the-counter (OTC) medicines. Some prescription medicines have acetaminophen and other ingredients in them. Using both prescription and OTC acetaminophen for pain can cause you to accidentally overdose. Read the labels on your OTC medicines with care. This will help you to clearly know the list of ingredients, how much to take, and any warnings. It may also help you not take too much acetaminophen. If you have questions or don't understand the information, ask your pharmacist or healthcare provider to explain it to you before you take the OTC medicine.   Managing nausea  Some people have an upset stomach (nausea) after surgery. This is often because of  anesthesia, pain, or pain medicine, less movement of food in the stomach, or the stress of surgery. These tips will help you handle nausea and eat healthy foods as you get better. If you were on a special food plan before surgery, ask your healthcare provider if you should follow it while you get better. Check with your provider on how your eating should progress. It may depend on the surgery you had. These general tips may help:   Don't push yourself to eat. Your body will tell you when to eat and how much.  Start off with clear liquids and soup. They're easier to digest.  Next try semi-solid foods as you feel ready. These include mashed potatoes, applesauce, and gelatin.  Slowly move to solid foods. Don’t eat fatty, rich, or spicy foods at first.  Don't force yourself to have 3 large meals a day. Instead eat smaller amounts more often.  Take pain medicines with a small amount of solid food, such as crackers or toast. This helps prevent nausea.  When to call your healthcare provider  Call your healthcare provider right away if you have any of these:   You still have too much pain, or the pain gets worse, after taking the medicine. The medicine may not be strong enough. Or there may be a complication from the surgery.  You feel too sleepy, dizzy, or groggy. The medicine may be too strong.  Side effects such as nausea or vomiting. Your healthcare provider may advise taking other medicines to .  Skin changes such as rash, itching, or hives. This may mean you have an allergic reaction. Your provider may advise taking other medicines.  The incision looks different (for instance, part of it opens up).  Bleeding or fluid leaking from the incision site, and weren't told to expect that.  Fever of 100.4°F (38°C) or higher, or as directed by your provider.  Call 911  Call 911 right away if you have:   Trouble breathing  Facial swelling    If you have obstructive sleep apnea   You were given anesthesia medicine during surgery  to keep you comfortable and free of pain. After surgery, you may have more apnea spells because of this medicine and other medicines you were given. The spells may last longer than normal.    At home:  Keep using the continuous positive airway pressure (CPAP) device when you sleep. Unless your healthcare provider tells you not to, use it when you sleep, day or night. CPAP is a common device used to treat obstructive sleep apnea.  Talk with your provider before taking any pain medicine, muscle relaxants, or sedatives. Your provider will tell you about the possible dangers of taking these medicines.  Contact your provider if your sleeping changes a lot even when taking medicines as directed.  [employer] last reviewed this educational content on 10/1/2021  © 7941-1109 [employer]. All rights reserved. This information is not intended as a substitute for professional medical care. Always follow your healthcare professional's instructions.        Discharge Instructions: After Your Surgery  You’ve just had surgery. During surgery, you were given medicine called anesthesia to keep you relaxed and free of pain. After surgery, you may have some pain or nausea. This is common. Here are some tips for feeling better and getting well after surgery.   Going home  Your healthcare provider will show you how to take care of yourself when you go home. They'll also answer your questions. Have an adult family member or friend drive you home. For the first 24 hours after your surgery:   Don't drive or use heavy equipment.  Don't make important decisions or sign legal papers.  Take medicines as directed.  Don't drink alcohol.  Have someone stay with you, if needed. They can watch for problems and help keep you safe.  Be sure to go to all follow-up visits with your healthcare provider. And rest after your surgery for as long as your provider tells you to.   Coping with pain  If you have pain after surgery, pain medicine will  help you feel better. Take it as directed, before pain becomes severe. Also, ask your healthcare provider or pharmacist about other ways to control pain. This might be with heat, ice, or relaxation. And follow any other instructions your surgeon or nurse gives you.      Stay on schedule with your medicine.     Tips for taking pain medicine  To get the best relief possible, remember these points:   Pain medicines can upset your stomach. Taking them with a little food may help.  Most pain relievers taken by mouth need at least 20 to 30 minutes to start to work.  Don't wait till your pain becomes severe before you take your medicine. Try to time your medicine so that you can take it before starting an activity. This might be before you get dressed, go for a walk, or sit down for dinner.  Constipation is a common side effect of some pain medicines. Call your healthcare provider before taking any medicines such as laxatives or stool softeners to help ease constipation. Also ask if you should skip any foods. Drinking lots of fluids and eating foods such as fruits and vegetables that are high in fiber can also help. Remember, don't take laxatives unless your surgeon has prescribed them.  Drinking alcohol and taking pain medicine can cause dizziness and slow your breathing. It can even be deadly. Don't drink alcohol while taking pain medicine.  Pain medicine can make you react more slowly to things. Don't drive or run machinery while taking pain medicine.  Your healthcare provider may tell you to take acetaminophen to help ease your pain. Ask them how much you're supposed to take each day. Acetaminophen or other pain relievers may interact with your prescription medicines or other over-the-counter (OTC) medicines. Some prescription medicines have acetaminophen and other ingredients in them. Using both prescription and OTC acetaminophen for pain can cause you to accidentally overdose. Read the labels on your OTC medicines  with care. This will help you to clearly know the list of ingredients, how much to take, and any warnings. It may also help you not take too much acetaminophen. If you have questions or don't understand the information, ask your pharmacist or healthcare provider to explain it to you before you take the OTC medicine.   Managing nausea  Some people have an upset stomach (nausea) after surgery. This is often because of anesthesia, pain, or pain medicine, less movement of food in the stomach, or the stress of surgery. These tips will help you handle nausea and eat healthy foods as you get better. If you were on a special food plan before surgery, ask your healthcare provider if you should follow it while you get better. Check with your provider on how your eating should progress. It may depend on the surgery you had. These general tips may help:   Don't push yourself to eat. Your body will tell you when to eat and how much.  Start off with clear liquids and soup. They're easier to digest.  Next try semi-solid foods as you feel ready. These include mashed potatoes, applesauce, and gelatin.  Slowly move to solid foods. Don’t eat fatty, rich, or spicy foods at first.  Don't force yourself to have 3 large meals a day. Instead eat smaller amounts more often.  Take pain medicines with a small amount of solid food, such as crackers or toast. This helps prevent nausea.  When to call your healthcare provider  Call your healthcare provider right away if you have any of these:   You still have too much pain, or the pain gets worse, after taking the medicine. The medicine may not be strong enough. Or there may be a complication from the surgery.  You feel too sleepy, dizzy, or groggy. The medicine may be too strong.  Side effects such as nausea or vomiting. Your healthcare provider may advise taking other medicines to .  Skin changes such as rash, itching, or hives. This may mean you have an allergic reaction. Your provider may  advise taking other medicines.  The incision looks different (for instance, part of it opens up).  Bleeding or fluid leaking from the incision site, and weren't told to expect that.  Fever of 100.4°F (38°C) or higher, or as directed by your provider.  Call 911  Call 911 right away if you have:   Trouble breathing  Facial swelling    If you have obstructive sleep apnea   You were given anesthesia medicine during surgery to keep you comfortable and free of pain. After surgery, you may have more apnea spells because of this medicine and other medicines you were given. The spells may last longer than normal.    At home:  Keep using the continuous positive airway pressure (CPAP) device when you sleep. Unless your healthcare provider tells you not to, use it when you sleep, day or night. CPAP is a common device used to treat obstructive sleep apnea.  Talk with your provider before taking any pain medicine, muscle relaxants, or sedatives. Your provider will tell you about the possible dangers of taking these medicines.  Contact your provider if your sleeping changes a lot even when taking medicines as directed.  [employer] last reviewed this educational content on 10/1/2021  © 6126-1107 [employer]. All rights reserved. This information is not intended as a substitute for professional medical care. Always follow your healthcare professional's instructions.

## 2024-12-03 ENCOUNTER — HOSPITAL ENCOUNTER (OUTPATIENT)
Facility: HOSPITAL | Age: 84
Setting detail: HOSPITAL OUTPATIENT SURGERY
Discharge: HOME | End: 2024-12-03
Attending: PLASTIC SURGERY | Admitting: PLASTIC SURGERY
Payer: MEDICARE

## 2024-12-03 ENCOUNTER — HOSPITAL DOCUMENTATION (OUTPATIENT)
Dept: SURGERY | Facility: CLINIC | Age: 84
End: 2024-12-03

## 2024-12-03 ENCOUNTER — ANESTHESIA EVENT (OUTPATIENT)
Dept: SURGERY | Facility: HOSPITAL | Age: 84
End: 2024-12-03
Payer: MEDICARE

## 2024-12-03 ENCOUNTER — ANESTHESIA (OUTPATIENT)
Dept: SURGERY | Facility: HOSPITAL | Age: 84
End: 2024-12-03
Payer: MEDICARE

## 2024-12-03 VITALS
WEIGHT: 135 LBS | TEMPERATURE: 98 F | DIASTOLIC BLOOD PRESSURE: 64 MMHG | HEIGHT: 58.5 IN | RESPIRATION RATE: 14 BRPM | SYSTOLIC BLOOD PRESSURE: 139 MMHG | BODY MASS INDEX: 27.58 KG/M2 | OXYGEN SATURATION: 95 % | HEART RATE: 58 BPM

## 2024-12-03 DIAGNOSIS — M65.341 TRIGGER RING FINGER OF RIGHT HAND: Primary | ICD-10-CM

## 2024-12-03 DIAGNOSIS — M65.841 OTHER SYNOVITIS AND TENOSYNOVITIS, RIGHT HAND: ICD-10-CM

## 2024-12-03 DIAGNOSIS — M65.341 TRIGGER RING FINGER OF RIGHT HAND: ICD-10-CM

## 2024-12-03 PROBLEM — Z04.9 OBSERVATION FOR SUSPECTED CONDITION: Status: ACTIVE | Noted: 2024-12-03

## 2024-12-03 PROCEDURE — 0LN70ZZ RELEASE RIGHT HAND TENDON, OPEN APPROACH: ICD-10-PCS | Performed by: PLASTIC SURGERY

## 2024-12-03 PROCEDURE — 26145 TENDON EXCISION PALM/FINGER: CPT | Performed by: PLASTIC SURGERY

## 2024-12-03 RX ORDER — KETOROLAC TROMETHAMINE 30 MG/ML
INJECTION, SOLUTION INTRAMUSCULAR; INTRAVENOUS AS NEEDED
Status: DISCONTINUED | OUTPATIENT
Start: 2024-12-03 | End: 2024-12-03 | Stop reason: SURG

## 2024-12-03 RX ORDER — DEXAMETHASONE SODIUM PHOSPHATE 4 MG/ML
VIAL (ML) INJECTION AS NEEDED
Status: DISCONTINUED | OUTPATIENT
Start: 2024-12-03 | End: 2024-12-03 | Stop reason: SURG

## 2024-12-03 RX ORDER — LIDOCAINE HYDROCHLORIDE 10 MG/ML
INJECTION, SOLUTION EPIDURAL; INFILTRATION; INTRACAUDAL; PERINEURAL AS NEEDED
Status: DISCONTINUED | OUTPATIENT
Start: 2024-12-03 | End: 2024-12-03 | Stop reason: SURG

## 2024-12-03 RX ORDER — ACETAMINOPHEN 500 MG
1000 TABLET ORAL ONCE
Status: COMPLETED | OUTPATIENT
Start: 2024-12-03 | End: 2024-12-03

## 2024-12-03 RX ORDER — SODIUM CHLORIDE, SODIUM LACTATE, POTASSIUM CHLORIDE, CALCIUM CHLORIDE 600; 310; 30; 20 MG/100ML; MG/100ML; MG/100ML; MG/100ML
INJECTION, SOLUTION INTRAVENOUS CONTINUOUS
Status: DISCONTINUED | OUTPATIENT
Start: 2024-12-03 | End: 2024-12-03

## 2024-12-03 RX ORDER — NALOXONE HYDROCHLORIDE 0.4 MG/ML
0.08 INJECTION, SOLUTION INTRAMUSCULAR; INTRAVENOUS; SUBCUTANEOUS AS NEEDED
Status: DISCONTINUED | OUTPATIENT
Start: 2024-12-03 | End: 2024-12-03

## 2024-12-03 RX ORDER — HYDROCODONE BITARTRATE AND ACETAMINOPHEN 7.5; 325 MG/1; MG/1
1 TABLET ORAL EVERY 4 HOURS PRN
Status: DISCONTINUED | OUTPATIENT
Start: 2024-12-03 | End: 2024-12-03

## 2024-12-03 RX ORDER — LIDOCAINE HYDROCHLORIDE 10 MG/ML
INJECTION, SOLUTION EPIDURAL; INFILTRATION; INTRACAUDAL; PERINEURAL AS NEEDED
Status: DISCONTINUED | OUTPATIENT
Start: 2024-12-03 | End: 2024-12-03 | Stop reason: HOSPADM

## 2024-12-03 RX ORDER — ONDANSETRON 2 MG/ML
INJECTION INTRAMUSCULAR; INTRAVENOUS AS NEEDED
Status: DISCONTINUED | OUTPATIENT
Start: 2024-12-03 | End: 2024-12-03 | Stop reason: SURG

## 2024-12-03 RX ADMIN — KETOROLAC TROMETHAMINE 15 MG: 30 INJECTION, SOLUTION INTRAMUSCULAR; INTRAVENOUS at 10:48:00

## 2024-12-03 RX ADMIN — LIDOCAINE HYDROCHLORIDE 50 MG: 10 INJECTION, SOLUTION EPIDURAL; INFILTRATION; INTRACAUDAL; PERINEURAL at 10:00:00

## 2024-12-03 RX ADMIN — ONDANSETRON 4 MG: 2 INJECTION INTRAMUSCULAR; INTRAVENOUS at 10:13:00

## 2024-12-03 RX ADMIN — DEXAMETHASONE SODIUM PHOSPHATE 4 MG: 4 MG/ML VIAL (ML) INJECTION at 10:13:00

## 2024-12-03 NOTE — ANESTHESIA POSTPROCEDURE EVALUATION
Patient: Lissette Mack    Procedure Summary       Date: 12/03/24 Room / Location: [facility] MAIN OR 01 / EM MAIN OR    Anesthesia Start: 0956 Anesthesia Stop: 1055    Procedure: Trigger Release Right Ring Finger and synovectomy (Right) Diagnosis:       Trigger ring finger of right hand      (Trigger ring finger of right hand [M65.341])    Surgeons: Martin Harding MD Anesthesiologist: Clau Roth MD    Anesthesia Type: MAC ASA Status: 3            Anesthesia Type: MAC    Vitals Value Taken Time   /64 12/03/24 1143   Temp 97.7 °F (36.5 °C) 12/03/24 1118   Pulse 58 12/03/24 1143   Resp 14 12/03/24 1143   SpO2 95 % 12/03/24 1143       [facility] AN Post Evaluation:   Patient Evaluated in PACU  Patient Participation: complete - patient participated  Level of Consciousness: awake  Pain Score: 0  Pain Management: adequate  Airway Patency:patent  Dental exam unchanged from preop  Yes    Nausea/Vomiting: none  Cardiovascular Status: acceptable  Respiratory Status: acceptable  Postoperative Hydration acceptable      Clau Roth MD  12/3/2024 11:51 AM

## 2024-12-03 NOTE — ANESTHESIA PREPROCEDURE EVALUATION
Anesthesia PreOp Note    HPI:     Lissette Mack is a 84 year old female who presents for preoperative consultation requested by: Martin Harding MD    Date of Surgery: 12/3/2024    Procedure(s):  Trigger Release Right Ring Finger  Indication: Trigger ring finger of right hand [M65.341]    Relevant Problems   No relevant active problems       NPO:  Last Liquid Consumption Date: 12/02/24  Last Liquid Consumption Time: 2300  Last Solid Consumption Date: 12/02/24  Last Solid Consumption Time: 2300  Last Liquid Consumption Date: 12/02/24          History Review:  Patient Active Problem List    Diagnosis Date Noted    Family history of colon cancer 12/01/2020    Moderate single current episode of major depressive disorder (HCC) 03/20/2018    Vitamin D deficiency 11/27/2017    Chronic obstructive pulmonary disease, unspecified COPD type (HCC) 11/27/2017    Aortic ectasia, thoracic (HCC) 04/11/2016    Atherosclerosis of native coronary artery of native heart without angina pectoris 04/11/2016    Allergic rhinitis due to pollen 04/11/2016    History of colon polyps 04/11/2016    SNHL (sensorineural hearing loss) 04/20/2015    Tarsal tunnel syndrome of right side 03/16/2015    Tinnitus of both ears 03/16/2015    Hepatic cyst 03/16/2015    MILAGRO (obstructive sleep apnea) 12/19/2013    Lumbar degenerative disc disease 04/25/2013    Essential hypertension, benign 09/21/2010    Elevated cholesterol 09/21/2010    Arthritis involving multiple sites 09/21/2010    Osteopenia 09/21/2010       Past Medical History:    ALLERGIC RHINITIS    CORONARY ARTERY DISEASE    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    DEPRESSION    Heart attack (HCC)    Hepatitis    possible history now resolved    Obstructive apnea    AHI 25 on CPAP 6 [employer]    Osteoarthritis    OTHER DISEASES    bilateral CTS    OTHER DISEASES    venous varicosities    Sleep apnea    CPAP    Visual impairment    glasses       Past Surgical History:   Procedure Laterality Date     Angioplasty (coronary)      stent    Cataract Bilateral April and May 2018    Colonoscopy  2015    Colonoscopy  2020    Colonoscopy N/A 2020    Procedure: COLONOSCOPY, POSSIBLE BIOPSY, POSSIBLE POLYPECTOMY 73570;  Surgeon: Carlos Bennett MD;  [facility]    Glaucoma surgery Bilateral April and May 2018    [place] [facility]    Other surgical history Right 10/31/2019    Exc seb cyst rt lower back/[facility]-Dr Devata    Spine surgery procedure unlisted      lumbar    Wrist arthroscop,release xvers lig Bilateral     endoscopic carpal tunnel release       Prescriptions Prior to Admission[1]  Current Medications and Prescriptions Ordered in Epic[2]    Allergies[3]    Family History   Problem Relation Age of Onset    Cancer Sister 72        breast cancer    Breast Cancer Sister 71        age 71    Breast Cancer Maternal Grandmother 82        age 82    Heart Disorder Father     Cancer Mother         colon CA    Breast Cancer Maternal Aunt 65        age 65     Social History     Socioeconomic History    Marital status: Single   Tobacco Use    Smoking status: Former     Current packs/day: 0.00     Average packs/day: 1 pack/day for 25.0 years (25.0 ttl pk-yrs)     Types: Cigarettes     Start date: 1976     Quit date: 2001     Years since quittin.9    Smokeless tobacco: Never    Tobacco comments:     quit    Vaping Use    Vaping status: Never Used   Substance and Sexual Activity    Alcohol use: Yes     Comment: 1 per day    Drug use: No   Other Topics Concern    Right Handed Yes       Available pre-op labs reviewed.             Vital Signs:  Body mass index is 27.73 kg/m².   height is 1.486 m (4' 10.5\") and weight is 61.2 kg (135 lb). Her oral temperature is 97.8 °F (36.6 °C). Her blood pressure is 135/82 and her pulse is 84. Her respiration is 17.   Vitals:    24 1348 24 0828 24 0841   BP:   135/82   Pulse:   84   Resp:   17   Temp:   97.8 °F (36.6 °C)    TempSrc:   Oral   Weight: 60.3 kg (133 lb) 61.2 kg (135 lb)    Height: 1.486 m (4' 10.5\") 1.486 m (4' 10.5\")         Anesthesia Evaluation     Patient summary reviewed and Nursing notes reviewed    Airway   Mallampati: II  TM distance: <3 FB  Neck ROM: limited  Dental      Pulmonary     breath sounds clear to auscultation  (+) COPD, sleep apnea  Cardiovascular   (+) hypertension, CAD    Rhythm: regular  Rate: normal    Neuro/Psych    (+)  neuromuscular disease,        GI/Hepatic/Renal    (+) liver disease    Endo/Other    Abdominal                  Anesthesia Plan:   ASA:  3  Plan:   MAC  Informed Consent Plan and Risks Discussed With:  Patient  Discussed plan with:  Attending      I have informed Lissette ANN Martina and/or legal guardian or family member of the nature of the anesthetic plan, benefits, risks including possible dental damage if relevant, major complications, and any alternative forms of anesthetic management.   All of the patient's questions were answered to the best of my ability. The patient desires the anesthetic management as planned.  Clau Roth MD  12/3/2024 9:15 AM  Present on Admission:  **None**           [1]   Medications Prior to Admission   Medication Sig Dispense Refill Last Dose/Taking    Ascorbic Acid (VITAMIN C OR) Take 1 tablet by mouth daily.   12/2/2024 Morning    cetirizine 10 MG Oral Tab Take 1 tablet (10 mg total) by mouth daily.   Past Week    ipratropium 0.03 % Nasal Solution 2 sprays by Nasal route 2 (two) times daily.   Taking    Omega-3 Fatty Acids (FISH OIL) 500 MG Oral Cap Take 3 capsules (1,500 mg total) by mouth daily.   11/25/2024    ALPRAZolam 0.25 MG Oral Tab Take 1 tablet (0.25 mg total) by mouth nightly as needed for Sleep. 30 tablet 0 11/30/2024    traZODone 50 MG Oral Tab Take 1 tablet (50 mg total) by mouth nightly. 90 tablet 3 12/2/2024 Evening    Fluticasone Propionate 50 MCG/ACT Nasal Suspension INSTILL 2 SPRAY IN EACH NOSTRIL DAILY 48 g 0 12/2/2024  Morning    Calcium-Magnesium 500-250 MG Oral Tab 1500mg daily   11/28/2024    aspirin 81 MG Oral Tab Take 2 tablets (162 mg total) by mouth daily.   12/3/2024 at  7:30 AM    Vitamin D3 1000 units Oral Tab Take 1 tablet (1,000 Units total) by mouth daily.   12/3/2024 Morning    Pravastatin Sodium (PRAVACHOL) 20 MG Oral Tab Take 2 tablets (40 mg total) by mouth nightly.   12/2/2024 Evening    HYDROcodone-acetaminophen 7.5-325 MG Oral Tab Take 1 tablet by mouth every 4 to 6 hours as needed for Pain. 10 tablet 0    [2]   Current Facility-Administered Medications Ordered in Epic   Medication Dose Route Frequency Provider Last Rate Last Admin    lactated ringers infusion   Intravenous Continuous Martin Harding MD 20 mL/hr at 12/03/24 0841 New Bag at 12/03/24 0841     No current [facility]-ordered outpatient medications on file.   [3] No Known Allergies

## 2024-12-03 NOTE — BRIEF OP NOTE
Pre-Operative Diagnosis: Trigger ring finger of right hand [M65.341]     Post-Operative Diagnosis: Trigger ring finger of right hand [M65.341]      Procedure Performed:   Trigger Release Right Ring Finger and synovectomy    Surgeons and Role:     * Martin Harding MD - Primary    Assistant(s):        Surgical Findings: Trigger, synovitis     Specimen: Synovitis     Estimated Blood Loss: 0 ml    Dictation Number:      Martin Fields MD  12/3/2024  10:54 AM

## 2024-12-03 NOTE — INTERVAL H&P NOTE
Pre-op Diagnosis: Trigger ring finger of right hand [M65.341]    The above referenced H&P was reviewed by Martin Fields MD on 12/3/2024, the patient was examined and no significant changes have occurred in the patient's condition since the H&P was performed.  I discussed with the patient and/or legal representative the potential benefits, risks and side effects of this procedure; the likelihood of the patient achieving goals; and potential problems that might occur during recuperation.  I discussed reasonable alternatives to the procedure, including risks, benefits and side effects related to the alternatives and risks related to not receiving this procedure.  We will proceed with procedure as planned.

## 2024-12-04 ENCOUNTER — OFFICE VISIT (OUTPATIENT)
Dept: SURGERY | Facility: CLINIC | Age: 84
End: 2024-12-04
Payer: COMMERCIAL

## 2024-12-04 ENCOUNTER — TELEPHONE (OUTPATIENT)
Dept: SURGERY | Facility: CLINIC | Age: 84
End: 2024-12-04

## 2024-12-04 DIAGNOSIS — M62.81 DISTAL MUSCLE WEAKNESS: Primary | ICD-10-CM

## 2024-12-04 DIAGNOSIS — M25.641 JOINT STIFFNESS OF HAND, RIGHT: ICD-10-CM

## 2024-12-04 NOTE — TELEPHONE ENCOUNTER
Left voice message for pt to please keep dressings CDI,elevate RH in the sling at all times, appointment today at 1500 with OT and call the office with any questions and/or concerns.  Dr Fields notified.

## 2024-12-04 NOTE — PROGRESS NOTES
RRF trigger release Post - Op Note:    Subjective: Everything went well.      Objective:  Occupational Therapy completed the following educational areas status post elective RRF trigger release procedure:    1)  Dressing was removed and handwashing technique was reviewed using anti bacterial soap.    2)  Dressing was changed using polysporin,gauze and spandage.    3)  Home exercise program reviewed and written handout provided for further reference:     A)   Tendon gliding exercises, x 10 reps per set, x 8 sets per day.     B)   AROM:   Full fisting exercises, x 20 reps per set, x 5 sets per day.     C)   Wrist flexion and extension exercises, x 20 reps per set, x 5 sets per day.    Note:  Instructed patient that exercises should not be painful.      Assessment: Patient verbalized and demonstrated independence with prescribed home exercise program and dressing change technique.    Plan: Patient will return to the clinic for suture removal as indicated by protocol.      Jose J Cee  OTR/L

## 2024-12-04 NOTE — OPERATIVE REPORT
[facility]    PATIENT'S NAME: RENE REYES   ATTENDING PHYSICIAN: Martin Fields MD   OPERATING PHYSICIAN: Martin Fields MD   PATIENT ACCOUNT#:   [id number]    LOCATION:  [facility]  MEDICAL RECORD #:   [id number]       YOB: 1940  ADMISSION DATE:       12/03/2024      OPERATION DATE:  12/03/2024    OPERATIVE REPORT      PREOPERATIVE DIAGNOSIS:  Right ring finger trigger.  POSTOPERATIVE DIAGNOSIS:  Right ring finger trigger with flexor digitorum sublimis synovitis.  PROCEDURE:  Right ring finger trigger release, flexor digitorum sublimis synovectomy.    INDICATIONS:  An 84-year-old female, right-hand dominant, with right ring finger triggering, 4-year history of pain with triggering and locking.  She underwent a steroid injection elsewhere, 11/23.  She has recurrent triggering and locking with pain.     FINDINGS:  The right ring finger has MP tenderness with triggering and locking.  At surgery was found considerable fraying of the flexor digitorum sublimis tendon with a longitudinal rent.  There is synovitis about the tendon infiltrating the tendon, but not rupturing it.  The flexor digitorum profundus is intact.    OPERATIVE TECHNIQUE:  After appropriate sedation, local anesthesia was effected with 1% plain lidocaine.  The area was prepped and draped in the usual sterile fashion.  A pneumatic tourniquet was inflated to 250 mmHg.    A zigzag incision was made over the first annular ligament.  We dissected and found a florid synovitis eroding through the A1 pulley and lying over the A1 pulley.  We incised the A1 pulley through its length.  There continued to be some triggering.  We found the frayed tendon which was smooth out, and we did a complete synovectomy of the flexor digitorum sublimis.  At this point, there was full excursion of the flexor tendons without crepitation, triggering and locking.  The flexor digitorum profundus was inspected and found to be  intact, with no evidence of fraying or synovitis.  Skin edges were reapproximated with 4-0 nylon.  A soft dressing was placed.    The tourniquet was released.  Total tourniquet time 26 minutes.    The patient tolerated the procedure well and left the operating suite in satisfactory condition.    Dictated By Martin Fields MD  d: 12/03/2024 10:57:01  t: 12/03/2024 20:32:05  [facility] [id number]  J/

## 2024-12-17 ENCOUNTER — APPOINTMENT (OUTPATIENT)
Dept: SURGERY | Facility: CLINIC | Age: 84
End: 2024-12-17
Payer: COMMERCIAL

## 2024-12-17 DIAGNOSIS — M62.81 DISTAL MUSCLE WEAKNESS: Primary | ICD-10-CM

## 2024-12-17 DIAGNOSIS — M25.641 JOINT STIFFNESS OF HAND, RIGHT: ICD-10-CM

## 2024-12-17 PROCEDURE — 97110 THERAPEUTIC EXERCISES: CPT | Performed by: OCCUPATIONAL THERAPIST

## 2024-12-17 PROCEDURE — 97165 OT EVAL LOW COMPLEX 30 MIN: CPT | Performed by: OCCUPATIONAL THERAPIST

## 2024-12-18 NOTE — PROGRESS NOTES
OCCUPATIONAL THERAPY EVALUATION:   Lissette Mack   [id number]       SUBJECTIVE:    HX of Injury: RRF trigger Release  Chief Complaint:  NO complaints.    Precautions: None  Premorbid Functional Status: Independent w/ driving / sitting, Independent w/ ADL's  Current Level of Function: As stated above.  Employment: Retired  Hand Dominance: right  Living Situation:  Not addressed.  Barriers to Learning: None  Patient Goals: Full use of the right hand.    Imaging/Tests: N/A        OBJECTIVE DATA:   PAIN:   Rating (1/10): 1/10 at rest, 1/10 with activity  Location:       SCAR/INCISION: Flat, Non-adhered, and Flexible    SENSORY:  Intact      AROM/PROM:  (Degrees)  RIGHT HAND:    Thumb IF MF RF SF   MP    90    PIP    90    DIP    40    ADAIR    220 ADAIR              STRENGTH: (lbs) Right Average Left Average   : NT NT   2 pt Pinch:     3 pt Pinch:     Lateral Pinch:         ASSESSMENT & PLAN OF CARE:    Treatment Provided: Patient was seen for an initial evaluation, hand washing: OT removed sutures per order: Reviewed cold cream scar massage technique  HEP:  AROM, Tendon glides, x 20 reps per set, x 5 sets daily. Reviewed hand elevation importance. Written handout was provided to reinforce today's treatment and educational session.       Rehabilitation Potential: Excellent    CLINICAL ASSESSMENT:    Patient/Caregiver Education Provided: Yes    Treatment Plan:  Therapeutic Exercise  Therapeutic Activities  Scar Management  Patient/Family Education    GOALS:  Short term goals to be reached in x 1 session:    1) Independent with HEP..    Long term goals to be reached in x 1 session:    1) Full functional use of the involved extremity for self-care, leisure and work related tasks:.        Patient will be seen 1 x /week for 2 weeks or a total of 2 visits.   Pt. was advised regarding the findings of this evaluation and agrees to the plan of care.     Jose J DURAN OTRL

## 2025-06-02 ENCOUNTER — OFFICE VISIT (OUTPATIENT)
Dept: SURGERY | Facility: CLINIC | Age: 85
End: 2025-06-02
Payer: COMMERCIAL

## 2025-06-02 DIAGNOSIS — M79.641 PAIN IN RIGHT HAND: ICD-10-CM

## 2025-06-02 DIAGNOSIS — M65.841 OTHER SYNOVITIS AND TENOSYNOVITIS, RIGHT HAND: Primary | ICD-10-CM

## 2025-06-02 PROCEDURE — 99213 OFFICE O/P EST LOW 20 MIN: CPT | Performed by: PLASTIC SURGERY

## 2025-06-02 RX ORDER — PRAVASTATIN SODIUM 40 MG
40 TABLET ORAL DAILY
COMMUNITY
Start: 2025-05-02

## 2025-06-02 RX ORDER — METOPROLOL SUCCINATE 25 MG/1
25 TABLET, EXTENDED RELEASE ORAL DAILY
COMMUNITY

## 2025-06-02 NOTE — H&P
Lissette Mack is a 85 year old female that presents with   Chief Complaint   Patient presents with    Pain     Right hand palm   .    REFERRED BY:  No ref. provider found      Pacemaker: No  Latex Allergy: no  Coumadin: No  Plavix: No  Other anticoagulants: No  Diet medication: No  Cardiac stents: YES     HAND DOMINANCE:  Right    Profession: Retired    RECONSTRUCTIVE HISTORY    SUN EXPOSURE   Current no   Past no   Sunburns no   Tanning salons current no   Tanning salons past no     SKIN CANCER    Personal history of skin cancer: none      HPI:       Surgery 1: RRF trigger / FDS synovectomy  - Date: 12/03/24  - Days Since: 181    85-year-old female right-hand-dominant with right palm pain    4 months duration, spontaneous, no trauma    Pain is slight, and not severe  Does not limit activity    No functional problems          Review of Systems:   Constitutional: No change in appetite, chill/rigors, or fatigue  GI: No jaundice  Endocrine: No generalized weakness  Neurological: No aphasia, loss of consciousness, or seizures    Musculoskeletal:     PAIN:  ONSET    4 months    LOCATION:  right   palm     NIGHT SYMPTOMS:  no    FUNCTIONAL PROBLEMS: no describes an intermittent aching     PREVIOUS TREATMENT None               PMH:     MEDICAL  Past Medical History[1]     SURGICAL  Past Surgical History[2]     ALLERIGIES  Allergies[3]     MEDICATIONS  Current Medications[4]     SOCIAL HISTORY  Short Social Hx on File[5]     FAMILY HISTORY  Family History[6]       PHYSICAL EXAM:     CONSTITUTIONAL: Overall appearance - Normal  HEENT: Normocephalic  EYES: Conjunctiva - Right: Normal, Left: Normal; EOMI  EARS: Inspection - Right: Normal, Left: Normal  NECK/THYROID: Inspection - Normal, Palpation - Normal, Thyroid gland - Normal, No adenopathy  RESPIRATORY: Inspection - Normal, Effort - Normal  CARDIOVASCULAR: Regular rhythm, No murmurs  ABDOMEN: Inspection - Normal, No abdominal tenderness  NEURO: Memory intact  PSYCH:  Oriented to person, place, time, and situation, Appropriate mood and affect      Hand Physical Exam:       Right hand full painless range of motion  Atrophic thenar intrinsics but excellent opposition  Ulnar intrinsics intact  Very slight mid palmar tenderness without mass or edema    No triggering  Trigger scar well-healed, nontender    Slight pain with RRF and RIF FDP flexion against resistance  No pain with passive extension    IMPRESSION:      Synovitis RIF/RRF FDP, very slight, localized to the palm    We discussed what synovitis is, including treatment options.  Synovitis is often difficult to cure.  It may require multiple treatments over a long period of time, and symptoms may not go away completely.  Even with satisfactory treatment, synovitis may recur.  Questions were answered and the patient wishes to proceed with treatment.       She is minimally symptomatic  She has no functional problems    No treatment is indicated at this point    If her symptoms worsen, anti-inflammatories might be considered      ASSESSMENT/PLAN:     No diagnosis found.      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    DEPRESSION    Heart attack (HCC)    Hepatitis    possible history now resolved    Obstructive apnea    AHI 25 on CPAP 6 [employer]    Osteoarthritis    OTHER DISEASES    bilateral CTS    OTHER DISEASES    venous varicosities    Sleep apnea    CPAP    Stenosing tenosynovitis of finger of right hand    Right ring finger trigger with flexor digitorum sublimis synovitis     Visual impairment    glasses   [2]   Past Surgical History:  Procedure Laterality Date    Angioplasty (coronary)      stent    Cataract Bilateral April and May 2018    Colonoscopy  11/2015    Colonoscopy  12/2020    Colonoscopy N/A 12/11/2020    Procedure: COLONOSCOPY, POSSIBLE BIOPSY, POSSIBLE POLYPECTOMY 60016;  Surgeon: Carlos Bennett MD;  Location: [facility]    Glaucoma surgery Bilateral April and May 2018    non G ophtho    Other surgical history Right 10/31/2019    Exc seb cyst rt lower back/[facility]-Dr Marinata    Rad excis jt lining,flexor,each Right 12/03/2024    Right ring finger trigger release, flexor digitorum sublimis synovectomy    Spine surgery procedure unlisted      lumbar    Wrist arthroscop,release xvers lig Bilateral 2005    endoscopic carpal tunnel release   [3] No Known Allergies  [4]   Current Outpatient Medications   Medication Sig Dispense Refill    metoprolol succinate ER 25 MG Oral Tablet 24 Hr Take 1 tablet (25 mg total) by mouth daily.      pravastatin 40 MG Oral Tab Take 1 tablet (40 mg total) by mouth daily.      Ascorbic Acid (VITAMIN C OR) Take 1 tablet by mouth daily.      cetirizine 10 MG Oral Tab Take 1 tablet (10 mg total) by mouth daily.      ipratropium 0.03 % Nasal Solution 2 sprays by Nasal route 2 (two) times daily.      Omega-3 Fatty Acids (FISH OIL) 500 MG Oral Cap Take 3 capsules (1,500 mg total) by mouth daily.      ALPRAZolam 0.25 MG Oral Tab Take 1 tablet (0.25 mg total) by mouth nightly as needed for Sleep. 30 tablet 0    traZODone 50 MG Oral Tab Take 1 tablet (50 mg total) by mouth nightly. 90 tablet 3    Calcium-Magnesium 500-250 MG Oral Tab 1500mg daily      aspirin 81 MG Oral Tab Take 2 tablets (162 mg total) by mouth daily.      Vitamin D3 1000 units Oral Tab Take 1 tablet (1,000 Units total) by mouth daily.     [5]   Social History  Socioeconomic History    Marital status: Single   Tobacco Use    Smoking status: Former     Current packs/day:  0.00     Average packs/day: 1 pack/day for 25.0 years (25.0 ttl pk-yrs)     Types: Cigarettes     Start date: 1976     Quit date: 2001     Years since quittin.4    Smokeless tobacco: Never    Tobacco comments:     quit    Vaping Use    Vaping status: Never Used   Substance and Sexual Activity    Alcohol use: Yes     Comment: 1 per day    Drug use: No   Other Topics Concern    Right Handed Yes   [6]   Family History  Problem Relation Age of Onset    Cancer Sister 72        breast cancer    Breast Cancer Sister 71        age 71    Breast Cancer Maternal Grandmother 82        age 82    Heart Disorder Father     Cancer Mother         colon CA    Breast Cancer Maternal Aunt 65        age 65

## (undated) DEVICE — INTENDED FOR TISSUE SEPARATION, AND OTHER PROCEDURES THAT REQUIRE A SHARP SURGICAL BLADE TO PUNCTURE OR CUT.: Brand: BARD-PARKER ® STAINLESS STEEL BLADES

## (undated) DEVICE — GAMMEX® PI HYBRID SIZE 7.5, STERILE POWDER-FREE SURGICAL GLOVE, POLYISOPRENE AND NEOPRENE BLEND: Brand: GAMMEX

## (undated) DEVICE — SUT ETHLN 4-0 18IN P-3 NABSRB BLK 13MM 3/8 CI

## (undated) DEVICE — SUTURE ETHIBOND 1 CT-1

## (undated) DEVICE — DRAPE SHEET LG

## (undated) DEVICE — SUTURE VICRYL 2-0 CT-1

## (undated) DEVICE — SUTURE NDL MAYO 4 .5 CRC TROC

## (undated) DEVICE — LOWER EXTREMITY: Brand: MEDLINE INDUSTRIES, INC.

## (undated) DEVICE — 3M™ STERI-STRIP™ BLEND TONE SKIN CLOSURES, B1557, TAN, 1/2 IN X 4 IN (12MM X 100MM), 6 STRIPS/ENVELOPE: Brand: 3M™ STERI-STRIP™

## (undated) DEVICE — SOLUTION IRRIG 1000ML 0.9% NACL USP BTL

## (undated) DEVICE — NEEDLE SPINAL 22X3-1/2 BLK

## (undated) DEVICE — CHLORAPREP ORANGE TINT 10.5ML

## (undated) DEVICE — CONTAINER SPEC STR 4OZ GRY LID

## (undated) DEVICE — NON-ADHERENT STRIPS,OIL EMULSION: Brand: CURITY

## (undated) DEVICE — GLOVE SUR 7 SENSICARE PI MIC PIP CRM PWD F

## (undated) DEVICE — GOWN,SIRUS,FAB REINF,RAGLAN,L,STERILE: Brand: MEDLINE

## (undated) DEVICE — SPONGE LAP 18X18 XRAY STRL

## (undated) DEVICE — ZIMMER® STERILE DISPOSABLE TOURNIQUET CUFF WITH PLC, DUAL PORT, SINGLE BLADDER, 30 IN. (76 CM)

## (undated) DEVICE — CHLORAPREP 26ML APPLICATOR

## (undated) DEVICE — TOWEL OR BLU 16X26 STRL

## (undated) DEVICE — ENCORE® LATEX ACCLAIM SIZE 8.5, STERILE LATEX POWDER-FREE SURGICAL GLOVE: Brand: ENCORE

## (undated) DEVICE — DRAPE SRG 70X60IN SPLT U IMPRV

## (undated) DEVICE — POLAR CARE CUBE COOLING UNIT

## (undated) DEVICE — SOL  .9 1000ML BTL

## (undated) DEVICE — PACK CDS PLASTIC HAND

## (undated) DEVICE — SUTURE PROLENE 3-0 8687H

## (undated) DEVICE — DISPOSABLE TOURNIQUET CUFF SINGLE BLADDER, DUAL PORT AND QUICK CONNECT CONNECTOR: Brand: COLOR CUFF